# Patient Record
Sex: FEMALE | Race: WHITE | NOT HISPANIC OR LATINO
[De-identification: names, ages, dates, MRNs, and addresses within clinical notes are randomized per-mention and may not be internally consistent; named-entity substitution may affect disease eponyms.]

---

## 2021-11-09 ENCOUNTER — TRANSCRIPTION ENCOUNTER (OUTPATIENT)
Age: 86
End: 2021-11-09

## 2023-08-08 ENCOUNTER — TRANSCRIPTION ENCOUNTER (OUTPATIENT)
Age: 88
End: 2023-08-08

## 2023-08-09 ENCOUNTER — APPOINTMENT (OUTPATIENT)
Dept: CARE COORDINATION | Facility: HOME HEALTH | Age: 88
End: 2023-08-09
Payer: MEDICARE

## 2023-08-09 DIAGNOSIS — E78.5 HYPERLIPIDEMIA, UNSPECIFIED: ICD-10-CM

## 2023-08-09 DIAGNOSIS — I21.4 NON-ST ELEVATION (NSTEMI) MYOCARDIAL INFARCTION: ICD-10-CM

## 2023-08-09 DIAGNOSIS — I10 ESSENTIAL (PRIMARY) HYPERTENSION: ICD-10-CM

## 2023-08-09 DIAGNOSIS — R55 SYNCOPE AND COLLAPSE: ICD-10-CM

## 2023-08-09 DIAGNOSIS — I48.91 UNSPECIFIED ATRIAL FIBRILLATION: ICD-10-CM

## 2023-08-09 DIAGNOSIS — Z86.711 PERSONAL HISTORY OF PULMONARY EMBOLISM: ICD-10-CM

## 2023-08-09 DIAGNOSIS — F03.90 UNSPECIFIED DEMENTIA W/OUT BEHAVIORAL DISTURBANCE: ICD-10-CM

## 2023-08-09 PROCEDURE — 99495 TRANSJ CARE MGMT MOD F2F 14D: CPT

## 2023-08-10 ENCOUNTER — TRANSCRIPTION ENCOUNTER (OUTPATIENT)
Age: 88
End: 2023-08-10

## 2023-08-13 VITALS
HEART RATE: 70 BPM | SYSTOLIC BLOOD PRESSURE: 122 MMHG | OXYGEN SATURATION: 92 % | RESPIRATION RATE: 14 BRPM | DIASTOLIC BLOOD PRESSURE: 60 MMHG

## 2023-08-13 PROBLEM — I48.91 A-FIB: Status: ACTIVE | Noted: 2023-08-13

## 2023-08-13 PROBLEM — I10 HTN (HYPERTENSION): Status: ACTIVE | Noted: 2023-08-13

## 2023-08-13 PROBLEM — R55 VASOVAGAL NEAR SYNCOPE: Status: ACTIVE | Noted: 2023-08-13

## 2023-08-13 PROBLEM — F03.90 DEMENTIA, UNSPECIFIED DEMENTIA SEVERITY, UNSPECIFIED DEMENTIA TYPE, UNSPECIFIED WHETHER BEHAVIORAL, PSYCHOTIC, OR MOOD DISTURBANCE OR ANXIETY: Status: ACTIVE | Noted: 2023-08-13

## 2023-08-13 PROBLEM — Z00.00 ENCOUNTER FOR PREVENTIVE HEALTH EXAMINATION: Status: ACTIVE | Noted: 2023-08-13

## 2023-08-13 PROBLEM — E78.5 HYPERLIPIDEMIA, UNSPECIFIED HYPERLIPIDEMIA TYPE: Status: ACTIVE | Noted: 2023-08-13

## 2023-08-13 PROBLEM — I21.4 NSTEMI (NON-ST ELEVATED MYOCARDIAL INFARCTION): Status: ACTIVE | Noted: 2023-08-13

## 2023-08-13 PROBLEM — Z86.711 HISTORY OF PULMONARY EMBOLISM: Status: RESOLVED | Noted: 2023-08-13 | Resolved: 2023-08-13

## 2023-08-13 RX ORDER — SERTRALINE HYDROCHLORIDE 50 MG/1
50 TABLET, FILM COATED ORAL
Qty: 90 | Refills: 0 | Status: ACTIVE | COMMUNITY
Start: 2023-06-13

## 2023-08-13 RX ORDER — NITROFURANTOIN (MONOHYDRATE/MACROCRYSTALS) 25; 75 MG/1; MG/1
100 CAPSULE ORAL
Qty: 45 | Refills: 0 | Status: COMPLETED | COMMUNITY
Start: 2023-03-20

## 2023-08-13 RX ORDER — TRAZODONE HYDROCHLORIDE 50 MG/1
50 TABLET ORAL
Qty: 90 | Refills: 0 | Status: ACTIVE | COMMUNITY
Start: 2023-07-05

## 2023-08-13 RX ORDER — ATORVASTATIN CALCIUM 40 MG/1
40 TABLET, FILM COATED ORAL
Qty: 90 | Refills: 0 | Status: ACTIVE | COMMUNITY
Start: 2023-07-05

## 2023-08-13 RX ORDER — ASPIRIN ENTERIC COATED TABLETS 81 MG 81 MG/1
81 TABLET, DELAYED RELEASE ORAL DAILY
Refills: 0 | Status: ACTIVE | COMMUNITY

## 2023-08-13 RX ORDER — NYSTATIN 100000 1/G
100000 POWDER TOPICAL
Qty: 30 | Refills: 0 | Status: COMPLETED | COMMUNITY
Start: 2023-03-03

## 2023-08-13 RX ORDER — PANTOPRAZOLE 40 MG/1
40 TABLET, DELAYED RELEASE ORAL
Qty: 90 | Refills: 0 | Status: ACTIVE | COMMUNITY
Start: 2023-04-27

## 2023-08-13 RX ORDER — ZINC OXIDE/COD LIVER OIL 40 %
PASTE (GRAM) TOPICAL
Qty: 99 | Refills: 0 | Status: ACTIVE | COMMUNITY
Start: 2023-02-28

## 2023-08-13 RX ORDER — CIPROFLOXACIN HYDROCHLORIDE 500 MG/1
500 TABLET, FILM COATED ORAL
Qty: 14 | Refills: 0 | Status: COMPLETED | COMMUNITY
Start: 2023-07-31

## 2023-08-13 RX ORDER — MIDODRINE HYDROCHLORIDE 10 MG/1
10 TABLET ORAL
Qty: 270 | Refills: 0 | Status: ACTIVE | COMMUNITY
Start: 2023-06-23

## 2023-08-13 RX ORDER — KETOCONAZOLE 20.5 MG/ML
2 SHAMPOO, SUSPENSION TOPICAL
Qty: 120 | Refills: 0 | Status: ACTIVE | COMMUNITY
Start: 2023-02-25

## 2023-08-13 RX ORDER — METOPROLOL SUCCINATE 25 MG/1
25 TABLET, EXTENDED RELEASE ORAL
Qty: 90 | Refills: 0 | Status: ACTIVE | COMMUNITY
Start: 2023-05-05

## 2023-08-13 RX ORDER — LORATADINE 5 MG
17 TABLET,CHEWABLE ORAL
Refills: 0 | Status: ACTIVE | COMMUNITY

## 2023-08-13 RX ORDER — NITROFURANTOIN MACROCRYSTALS 100 MG/1
100 CAPSULE ORAL
Qty: 14 | Refills: 0 | Status: COMPLETED | COMMUNITY
Start: 2023-03-21

## 2023-08-13 RX ORDER — NYSTATIN 100000 [USP'U]/G
100000 CREAM TOPICAL
Qty: 90 | Refills: 0 | Status: ACTIVE | COMMUNITY
Start: 2023-07-05

## 2023-08-13 RX ORDER — DABIGATRAN ETEXILATE 150 MG/1
150 CAPSULE ORAL
Qty: 180 | Refills: 0 | Status: ACTIVE | COMMUNITY
Start: 2023-07-05

## 2023-08-13 NOTE — CURRENT MEDS
[Takes medication as prescribed] : takes [None] : Patient does not have any barriers to medication adherence [Yes] : Reviewed medication list for presence of high-risk medications. [Blood Thinners] : blood thinners [Sedatives] : sedatives

## 2023-08-14 NOTE — HEALTH RISK ASSESSMENT
[Reviewed no changes] : Reviewed, no changes [Designated Healthcare Proxy] : Designated healthcare proxy [Name: ___] : Health Care Proxy's Name: [unfilled]  [Relationship: ___] : Relationship: [unfilled] [DNR] : DNR [DNI] : DNI [FreeTextEntry4] : Pt has [facility] in the home. Her nephew Jet Bush is HCP. [phone]

## 2023-08-14 NOTE — PHYSICAL EXAM
[No Acute Distress] : no acute distress [Well Nourished] : well nourished [Well Developed] : well developed [Well-Appearing] : well-appearing [No Respiratory Distress] : no respiratory distress  [Clear to Auscultation] : lungs were clear to auscultation bilaterally [No Accessory Muscle Use] : no accessory muscle use [Normal Rate] : normal rate  [Regular Rhythm] : with a regular rhythm [Normal S1, S2] : normal S1 and S2 [No Murmur] : no murmur heard [Pedal Pulses Present] : the pedal pulses are present [No Edema] : there was no peripheral edema [Limited Balance] : the patient's balance was impaired [Visual Intact] : visual attention was ~T not ~L decreased [Repeating Phrases] : no difficulty repeating a phrase [Flat] : flat [Person] : disoriented to person [Place] : disoriented to place [Time] : disoriented to time [Short Term Intact] : short term memory impaired [Remote Intact] : remote memory impaired [Registration Intact] : recent registration memory impaired [Span Intact] : the attention span was decreased [Concentration Intact] : a decrease in concentrating ability was observed [Naming Objects] : difficulty naming common objects [Writing A Sentence] : difficulty writing a sentence [Past History] : inadequate fund of knowledge regarding past history [Vocabulary] : inadequate fund of knowledge regarding vocabulary [Labile] : not labile [de-identified] : answers w/1 word answers [de-identified] : pt with late stages of dementia

## 2023-08-14 NOTE — ASSESSMENT
[FreeTextEntry1] : Elaine Craig  is being seen after discharge home from [facility]. She was admitted on  8/3/23for NSTEMI and discharged home on   8/7/23  Discharge medications were reviewed and reconciled with the current medication list and medications in home. #NSTEMI No CP, syncope, diaphoresis +fatigue Cont ASA, Pradaxa Cont Metoprolol, Atorvastatin  #Dementia Pt responds with 1-word answers when prompted by HHA Cont Sertrline, Trazodone  #A-fib HR controlled, regular rhythm No syncopal episodes Cont Pradaxa, Metoprolol  Reminded of TCM program and encouraged pt to call with any questions or concerns and especially with worsening of symptoms. Verbalized understanding.

## 2023-08-14 NOTE — COUNSELING
[Fall prevention counseling provided] : Fall prevention counseling provided [Adequate lighting] : Adequate lighting [No throw rugs] : No throw rugs [Use proper foot wear] : Use proper foot wear [Use recommended devices] : Use recommended devices [None] : None [Good understanding] : Patient has a good understanding of lifestyle changes and steps needed to achieve self management goal [de-identified] : 24/7 [facility] care

## 2023-08-14 NOTE — REVIEW OF SYSTEMS
[Muscle Weakness] : muscle weakness [Confusion] : confusion [Unsteady Walking] : ataxia [Memory Loss] : memory loss [Negative] : Respiratory [Fatigue] : fatigue [FreeTextEntry9] : needs assistance w/ADL's [de-identified] : pt with dementia

## 2023-08-14 NOTE — HISTORY OF PRESENT ILLNESS
[Post-hospitalization from ___ Hospital] : Post-hospitalization from [unfilled] Hospital [Admitted on: ___] : The patient was admitted on [unfilled] [Discharged on ___] : discharged on [unfilled] [Discharge Summary] : discharge summary [Discharge Med List] : discharge medication list [Med Reconciliation] : medication reconciliation has been completed [Patient Contacted By: ____] : and contacted by [unfilled] [FreeTextEntry2] : Copied from EMR< "HOSPITAL COURSE: Patient was admitted for elevated troponin and syncope. Patient had a history of vasovagal syncope, and there was recurrence in the hospital. Her troponin peaked at 161 and then trended down again. Cardiology felt it could be NSTEMI vs demand ischemia. Given patient's overall condition, cardiology recommended medical management. In the afternoon of 8/5, patient developed rapid atrial fibrillation, which eventually responded to digoxin loading dose and one dose of metoprolol. There was no recurrent episode. Cardiology recommended no medication change, given patient's overall condition. Patient was diagnosed with urinary tract infection and started on cipro as outpatient. Upon hospital admission, urinalysis was normal, and therefore antibiotic was discontinued."  Pt seen for transitional care management in her home. She has HHA 24/7 pt not responding to NP, she answers in 1-word answers.HHA reports no new symptoms, no syncope, no CP. She reports +fatigue.

## 2023-08-17 ENCOUNTER — TRANSCRIPTION ENCOUNTER (OUTPATIENT)
Age: 88
End: 2023-08-17

## 2023-08-22 ENCOUNTER — TRANSCRIPTION ENCOUNTER (OUTPATIENT)
Age: 88
End: 2023-08-22

## 2023-08-29 ENCOUNTER — TRANSCRIPTION ENCOUNTER (OUTPATIENT)
Age: 88
End: 2023-08-29

## 2023-09-02 ENCOUNTER — TRANSCRIPTION ENCOUNTER (OUTPATIENT)
Age: 88
End: 2023-09-02

## 2023-09-05 ENCOUNTER — TRANSCRIPTION ENCOUNTER (OUTPATIENT)
Age: 88
End: 2023-09-05